# Patient Record
Sex: FEMALE | Race: WHITE
[De-identification: names, ages, dates, MRNs, and addresses within clinical notes are randomized per-mention and may not be internally consistent; named-entity substitution may affect disease eponyms.]

---

## 2017-03-16 ENCOUNTER — HOSPITAL ENCOUNTER (OUTPATIENT)
Dept: HOSPITAL 80 - FIMAGING | Age: 32
End: 2017-03-16
Attending: NURSE PRACTITIONER
Payer: MEDICAID

## 2017-03-16 DIAGNOSIS — Z12.39: Primary | ICD-10-CM

## 2017-03-16 DIAGNOSIS — Z86.018: ICD-10-CM

## 2017-03-16 DIAGNOSIS — N63: ICD-10-CM

## 2017-05-16 ENCOUNTER — HOSPITAL ENCOUNTER (OUTPATIENT)
Dept: HOSPITAL 80 - FIMAGING | Age: 32
End: 2017-05-16
Attending: SPECIALIST
Payer: MEDICAID

## 2017-05-16 DIAGNOSIS — R74.8: Primary | ICD-10-CM

## 2018-04-24 ENCOUNTER — HOSPITAL ENCOUNTER (EMERGENCY)
Dept: HOSPITAL 80 - FED | Age: 33
Discharge: HOME | End: 2018-04-24
Payer: MEDICAID

## 2018-04-24 VITALS — DIASTOLIC BLOOD PRESSURE: 68 MMHG | SYSTOLIC BLOOD PRESSURE: 96 MMHG

## 2018-04-24 DIAGNOSIS — B96.89: ICD-10-CM

## 2018-04-24 DIAGNOSIS — N39.0: ICD-10-CM

## 2018-04-24 DIAGNOSIS — N83.201: Primary | ICD-10-CM

## 2018-04-24 DIAGNOSIS — E86.9: ICD-10-CM

## 2018-04-24 LAB — PLATELET # BLD: 208 10^3/UL (ref 150–400)

## 2018-04-24 NOTE — EDPHY
H & P


Time Seen by Provider: 04/24/18 14:30


HPI/ROS: 





Chief complaint.  Abdominal pain





HPI.  Patient is a 32-year-old female presents emergency department with 

abdominal pain that began at 10:00 a.m. This morning.  She has a history of 

dysmenorrhea.  She just began her menstrual period.  She describes low 

abdominal cramping that radiates to her back.  It is associated with vomiting 

and diarrhea.  No urinary symptoms.  No fever.  She has had similar symptoms 

previously.  She also notes some shortness of breath that began with the 

vomiting.  She does not have her inhaler with her





ROS


Constitutional.  no fever/chills, no weakness


Eyes.  no problems with vision


ENT.  no sore throat, no nasal drainage


Cardiovascular.  no chest pain


Respiratory.  no shortness of breath, no cough


Abdominal.  Abdominal pain and vaginal bleeding with vomiting and diarrhea


.  no problems urinating


MS.  no calf pain/swelling, no neck/back pain, no joint pain


Skin.  no rash


Lymph.  no swollen glands


Neuro.  no headache, no dizziness, no difficulty walking or with speech


Past Medical/Surgical History: 





Past medical history significant ovarian cyst and asthma


Social History: 





, nonsmoker, no alcohol


Smoking Status: Never smoked


Physical Exam: 





General Appearance:  Alert pleasant well-developed female mild distress vitals 

are stable


Eyes: Pupils equal and round no pallor or injection.


ENT, Mouth:  Mucous membranes are moist.


Respiratory:  No retractions.  Mild inspiratory expiratory wheezing


Cardiovascular: Regular rate and rhythm.


Gastrointestinal:  Abdomen is soft with suprapubic tenderness.  No masses.  

Normal bowel sounds


Neurological:  Awake and alert, sensory and motor exams grossly normal.


Skin: Warm and dry, no rashes.


Musculoskeletal:  Neck is supple nontender.


Extremities  symmetrical, full range of motion.


Psychiatric: Patient is oriented X 3, there is no agitation.


Constitutional: 


 Initial Vital Signs











Temperature (C)  36.6 C   04/24/18 14:08


 


Heart Rate  73   04/24/18 14:08


 


Respiratory Rate  18   04/24/18 14:08


 


Blood Pressure  127/82 H  04/24/18 14:08


 


O2 Sat (%)  97   04/24/18 14:08








 











O2 Delivery Mode               Room Air














Allergies/Adverse Reactions: 


 





Penicillins Allergy (Verified 04/24/18 14:07)


 








Home Medications: 














 Medication  Instructions  Recorded


 


Promethazine HCl [Phenergan] 25 mg PO Q6 #10 tab 03/03/15


 


oxyCODONE/APAP 5/325 [Percocet 1 tab PO Q6 #10 tab 03/03/15





5/325]  


 


Cephalexin [Keflex (*)] 500 mg PO TID #15 cap 04/24/18














Medical Decision Making





- Diagnostics


Imaging Results: 


 Imaging Impressions





Pelvic/Renal Ultrasound  04/24/18 14:54


Impression: 


1. Right ovarian simple cyst measuring 2.9 x 2.5 x 2.4 cm.


2. No ovarian torsion or significant free fluid.


3. No uterine leiomyomata.


 


Findings and recommendations discussed with Emergency Department physician, 

Constantino Tobar at 1558 hour, 4/24/2018.


 


Final report concurs with initial preliminary interpretation.








Ultrasound of the pelvis reveals a 2.5 cm ovarian cyst.  No evidence for torsion


ED Course/Re-evaluation: 





Re-evaluation at about 3:40 p.m. Patient is stable and feeling better.  No 

abdominal pain now.





Re-evaluation 4:10 p.m..  Patient and her  and I discussed imaging and 

lab results.  We discussed treatment plan including criteria for return 

importance of follow-up and further evaluation.  They expressed understanding 

and agreement


Differential Diagnosis: 





I considered urinary tract infection, ectopic pregnancy, ovarian cyst, 

dysmenorrhea





- Data Points


Laboratory Results: 


 Laboratory Results





 04/24/18 14:30 





 04/24/18 14:30 





 











  04/24/18 04/24/18 04/24/18





  15:20 14:30 14:30


 


WBC      





    


 


RBC      





    


 


Hgb      





    


 


Hct      





    


 


MCV      





    


 


MCH      





    


 


MCHC      





    


 


RDW      





    


 


Plt Count      





    


 


MPV      





    


 


Neut % (Auto)      





    


 


Lymph % (Auto)      





    


 


Mono % (Auto)      





    


 


Eos % (Auto)      





    


 


Baso % (Auto)      





    


 


Nucleat RBC Rel Count      





    


 


Absolute Neuts (auto)      





    


 


Absolute Lymphs (auto)      





    


 


Absolute Monos (auto)      





    


 


Absolute Eos (auto)      





    


 


Absolute Basos (auto)      





    


 


Absolute Nucleated RBC      





    


 


Immature Gran %      





    


 


Immature Gran #      





    


 


Sodium      142 mEq/L mEq/L





     (135-145) 


 


Potassium      4.0 mEq/L mEq/L





     (3.5-5.2) 


 


Chloride      105 mEq/L mEq/L





     () 


 


Carbon Dioxide      23 mEq/l mEq/l





     (22-31) 


 


Anion Gap      14 mEq/L mEq/L





     (8-16) 


 


BUN      11 mg/dL mg/dL





     (7-23) 


 


Creatinine      0.7 mg/dL mg/dL





     (0.6-1.0) 


 


Estimated GFR      > 60 





    


 


Glucose      101 mg/dL H mg/dL





     () 


 


Calcium      9.6 mg/dL mg/dL





     (8.5-10.4) 


 


Beta HCG, Qual    NEGATIVE   





    


 


Urine Color  YELLOW     





    


 


Urine Appearance  HAZY     





    


 


Urine pH  9.0  H     





   (5.0-7.5)   


 


Ur Specific Gravity  1.010     





   (1.002-1.030)   


 


Urine Protein  2+  H     





   (NEGATIVE)   


 


Urine Ketones  1+  H     





   (NEGATIVE)   


 


Urine Blood  3+  H     





   (NEGATIVE)   


 


Urine Nitrate  NEGATIVE     





   (NEGATIVE)   


 


Urine Bilirubin  NEGATIVE     





   (NEGATIVE)   


 


Urine Urobilinogen  NEGATIVE EU EU    





   (0.2-1.0)   


 


Ur Leukocyte Esterase  TRACE  H     





   (NEGATIVE)   


 


Urine RBC   /hpf H /hpf    





   (0-3)   


 


Urine WBC  25-50 /hpf H /hpf    





   (0-3)   


 


Ur Epithelial Cells  1+ /lpf /lpf    





   (NONE-1+)   


 


Urine Bacteria  1+ /hpf H /hpf    





   (NONE SEEN)   


 


Urine Glucose  NEGATIVE     





   (NEGATIVE)   














  04/24/18





  14:30


 


WBC  10.78 10^3/uL H 10^3/uL





   (3.80-9.50) 


 


RBC  4.64 10^6/uL 10^6/uL





   (4.18-5.33) 


 


Hgb  14.2 g/dL g/dL





   (12.6-16.3) 


 


Hct  41.6 % %





   (38.0-47.0) 


 


MCV  89.7 fL fL





   (81.5-99.8) 


 


MCH  30.6 pg pg





   (27.9-34.1) 


 


MCHC  34.1 g/dL g/dL





   (32.4-36.7) 


 


RDW  12.6 % %





   (11.5-15.2) 


 


Plt Count  208 10^3/uL 10^3/uL





   (150-400) 


 


MPV  11.4 fL fL





   (8.7-11.7) 


 


Neut % (Auto)  88.8 % H %





   (39.3-74.2) 


 


Lymph % (Auto)  7.1 % L %





   (15.0-45.0) 


 


Mono % (Auto)  2.9 % L %





   (4.5-13.0) 


 


Eos % (Auto)  0.4 % L %





   (0.6-7.6) 


 


Baso % (Auto)  0.4 % %





   (0.3-1.7) 


 


Nucleat RBC Rel Count  0.0 % %





   (0.0-0.2) 


 


Absolute Neuts (auto)  9.58 10^3/uL H 10^3/uL





   (1.70-6.50) 


 


Absolute Lymphs (auto)  0.77 10^3/uL L 10^3/uL





   (1.00-3.00) 


 


Absolute Monos (auto)  0.31 10^3/uL 10^3/uL





   (0.30-0.80) 


 


Absolute Eos (auto)  0.04 10^3/uL 10^3/uL





   (0.03-0.40) 


 


Absolute Basos (auto)  0.04 10^3/uL 10^3/uL





   (0.02-0.10) 


 


Absolute Nucleated RBC  0.00 10^3/uL 10^3/uL





   (0-0.01) 


 


Immature Gran %  0.4 % %





   (0.0-1.1) 


 


Immature Gran #  0.04 10^3/uL 10^3/uL





   (0.00-0.10) 


 


Sodium  





  


 


Potassium  





  


 


Chloride  





  


 


Carbon Dioxide  





  


 


Anion Gap  





  


 


BUN  





  


 


Creatinine  





  


 


Estimated GFR  





  


 


Glucose  





  


 


Calcium  





  


 


Beta HCG, Qual  





  


 


Urine Color  





  


 


Urine Appearance  





  


 


Urine pH  





  


 


Ur Specific Gravity  





  


 


Urine Protein  





  


 


Urine Ketones  





  


 


Urine Blood  





  


 


Urine Nitrate  





  


 


Urine Bilirubin  





  


 


Urine Urobilinogen  





  


 


Ur Leukocyte Esterase  





  


 


Urine RBC  





  


 


Urine WBC  





  


 


Ur Epithelial Cells  





  


 


Urine Bacteria  





  


 


Urine Glucose  





  











Medications Given: 


 








Discontinued Medications





Albuterol/Ipratropium (Duoneb)  3 ml IH EDNOW ONE


   Stop: 04/24/18 14:55


   Last Admin: 04/24/18 15:02 Dose:  3 ml


Sodium Chloride (Ns)  1,000 mls @ 0 mls/hr IV EDNOW ONE; Wide Open


   PRN Reason: Protocol


   Stop: 04/24/18 14:55


   Last Admin: 04/24/18 15:02 Dose:  1,000 mls


Sodium Chloride (Ns)  1,000 mls @ 0 mls/hr IV EDNOW ONE; Wide Open


   PRN Reason: Protocol


   Stop: 04/24/18 14:55


   Last Admin: 04/24/18 15:02 Dose:  1,000 mls


Ketorolac Tromethamine (Toradol)  30 mg IVP EDNOW ONE


   Stop: 04/24/18 14:55


   Last Admin: 04/24/18 15:02 Dose:  30 mg


Ondansetron HCl (Zofran)  4 mg IVP EDNOW ONE


   Stop: 04/24/18 14:55


   Last Admin: 04/24/18 15:03 Dose:  4 mg








Departure





- Departure


Disposition: Home, Routine, Self-Care


Clinical Impression: 


Ovarian cyst


Qualifiers:


 Laterality: right Qualified Code(s): N83.201 - Unspecified ovarian cyst, right 

side





Urinary tract infection


Qualifiers:


 Urinary tract infection type: site unspecified Hematuria presence: with 

hematuria Qualified Code(s): N39.0 - Urinary tract infection, site not specified

; R31.9 - Hematuria, unspecified; R31.9 - Hematuria, unspecified





Condition: Good


Instructions:  Ovarian Cyst (ED), Urinary Tract Infection in Women (ED)


Additional Instructions: 


Drink plenty of fluids and stay hydrated.  Ibuprofen 600 mg every 6 hr for 

discomfort.  Cephalexin as antibiotic.  Return for worsening symptoms.  Recheck 

in 2 days if not improved


Referrals: 


Vickie Neumann, PAC [Primary Care Provider] - 2-3 days, if not improved


Prescriptions: 


Cephalexin [Keflex (*)] 500 mg PO TID #15 cap

## 2018-10-08 ENCOUNTER — HOSPITAL ENCOUNTER (OUTPATIENT)
Dept: HOSPITAL 80 - FSGY | Age: 33
Discharge: HOME | End: 2018-10-08
Attending: ORTHOPAEDIC SURGERY
Payer: MEDICAID

## 2018-10-08 VITALS — DIASTOLIC BLOOD PRESSURE: 70 MMHG | SYSTOLIC BLOOD PRESSURE: 109 MMHG

## 2018-10-08 DIAGNOSIS — Z88.0: ICD-10-CM

## 2018-10-08 DIAGNOSIS — Q65.89: ICD-10-CM

## 2018-10-08 DIAGNOSIS — M25.852: Primary | ICD-10-CM

## 2018-10-08 DIAGNOSIS — Z87.440: ICD-10-CM

## 2018-10-08 PROCEDURE — BQ111ZZ FLUOROSCOPY OF LEFT HIP USING LOW OSMOLAR CONTRAST: ICD-10-PCS | Performed by: ORTHOPAEDIC SURGERY

## 2018-10-08 PROCEDURE — C1713 ANCHOR/SCREW BN/BN,TIS/BN: HCPCS

## 2018-10-08 PROCEDURE — 0SQB4ZZ REPAIR LEFT HIP JOINT, PERCUTANEOUS ENDOSCOPIC APPROACH: ICD-10-PCS | Performed by: ORTHOPAEDIC SURGERY

## 2018-10-08 PROCEDURE — 0SBB4ZZ EXCISION OF LEFT HIP JOINT, PERCUTANEOUS ENDOSCOPIC APPROACH: ICD-10-PCS | Performed by: ORTHOPAEDIC SURGERY

## 2018-10-08 NOTE — PDANEPAE
ANE History of Present Illness


here for hip scope








ANE Past Medical History





- Cardiovascular History


Hx Hypertension: No


Hx Arrhythmias: No


Hx Chest Pain: No


Hx Coronary Artery / Peripheral Vascular Disease: No


Hx CHF / Valvular Disease: No


Hx Palpitations: No


Cardiovascular History Comment: POSS SL MURMUR AS TEEN





- Pulmonary History


Hx COPD: No


Hx Asthma/Reactive Airway Disease: No


Hx Recent Upper Respiratory Infection: No


Hx Oxygen in Use at Home: No


Hx Sleep Apnea: No


Sleep Apnea Screening Result - Last Documented: Negative


Pulmonary History Comment: VOCAL CORD DISORDER,SOMETIMES AFTER BIG MEAL.  

INTERMITTENT BREATHING ISSUES DURING HIGH SCHOOL NO SPECIFIC TRIGGERS 

IDENTIFIED.  INFREQUENT USE OF INHALER





- Neurologic History


Hx Cerebrovascular Accident: No


Hx Seizures: No


Hx Dementia: No





- Endocrine History


Hx Diabetes: No





- Renal History


Hx Renal Disorders: Yes


Renal History Comment: HX OF UTI'S





- Liver History


Hx Hepatic Disorders: No





- Neurological & Psychiatric Hx


Hx Neurological and Psychiatric Disorders: No





- Cancer History


Hx Cancer: No





- Congenital Disorder History


Hx Congenital Disorders: Yes


Congenital History Comment: MAGALY HIPS





- GI History


Hx Gastrointestinal Disorders: Yes


Gastrointestinal History Comment: PRONE TO NAUSEA AT THE SLIGHTEST PROVACATION 

CAN HAPPEN DURING MENSES





- Other Health History


Other Health History: INTERMITTENT RASH





- Chronic Pain History


Chronic Pain: Yes (MAGALY HIPS)





- Surgical History


Prior Surgeries: LT HIP SCOPE FEMOROPLASTY 10/8/18





ANE Review of Systems


Review of Systems: 








- Exercise capacity


METS (RN): 6 METS





ANE Patient History





- Allergies


Allergies/Adverse Reactions: 








Penicillins Allergy (Verified 09/10/18 11:41)


 RASH,HIVES HANDS & ARMS








- Home Medications


Home medications: home medication list seen and reviewed


Home Medications: 








Ibuprofen [Motrin (*)] 400 mg PO Q8 PRN 09/14/18 [Last Taken Unknown]


Imiquimod 1 each TP MOWEFR@21 09/14/18 [Last Taken Unknown]


Levalbuterol Inhaler [Xopenex Hfa Inhaler (*)] 2 puffs IH DAILY PRN 09/14/18 [

Last Taken Unknown]


Ondansetron Odt [Zofran Odt 4 mg (*)] 4 mg PO Q4 PRN 09/14/18 [Last Taken 

Unknown]








- NPO status


NPO Status: no food or drink >8 hours





- Anes Hx


Anes Hx: no prior problems





- Smoking Hx


Smoking Status: Never smoked





- Alcohol Use


Alcohol Use: Rarely





- Family Anes Hx


Family Anes Hx: none


Family Hx Anesthesia Complications: NEG





ANE Labs/Vital Signs





- Vital Signs


Vital Signs: reviewed preoperatively; see RN documention for details


Height: 177.8 cm


Weight: 63.503 kg





ANE Physical Exam





- Airway


Neck exam: FROM


Mallampati Score: Class 2


Mouth exam: normal dental/mouth exam





- Pulmonary


Pulmonary: no respiratory distress, clear to auscultation





- Cardiovascular


Cardiovascular: regular rate and rhythym, no murmur, rub, or gallop





- ASA Status


ASA Status: II





ANE Anesthesia Plan


Anesthesia Plan: general endotracheal anesthesia

## 2018-10-08 NOTE — POSTOPPROG
Post Op Note


Date of Operation: 10/08/18


Surgeon: Eric Bravo


Assistant: Dr. Joyner


Anesthesia: GET(General Endotracheal)


Pre-op Diagnosis: Left JENNY


Post-op Diagnosis: Same


Procedure: Left Hip Arthroscopy


Inf/Abcess present in the surg proc area at time of surgery?: No

## 2018-10-08 NOTE — PDGENHP
History and Physical





- Chief Complaint


LEFT HIP PAIN





- History of Present Illness


Diagnosis:





1.   Bilateral Femoroacetabular impingement (JENNY) Cam type


2.   Bilateral Hip Dyplasia


3.   Bilateral femoral/tibial malrotation (Left side symptomatic >>> Right)





HISTORY OF PRESENT ILLNESS:


Avtaris a 33 y.o.~~~active female~who I have had the pleasure to consult on 

today. I have enjoyed meeting her. She~lives in [place], [place]. 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 

a juggler/entertainer. ~She~is ; she~has no children. ~Avtarenjoys 

unicycling, juggling, hiking, rock climbing.





Dimass bilateral hip pain started several years ago, with some~recalled 

trauma or injury, and with some~previous complaints. Back in 2010- doing some 

hard stretching and felt a sensation of it slipping out of place - but this was 

nonpainful. Then 2 years ago started having lower back pain followed by hip 

pain in the past 6 months. Hips started bothering her when she began PT for her 

lower back. Avtardoes not have~a known history of hip dysplasia (h/o 

dysplasia in sister and father). 





Presentation today is of anterior bilateral hip pain (left >>~right). ~The hip 

does~wake her~at night and does~click and catch on her. Sitting can be a real 

struggle for her. Avtardoes~report suffering from lower back pain episodes. 





Avtarhas not~participated in physical therapy (for the hip specifically) and 

has not~tried other conservative measures.





Avtarhas not~utilized medication for pain management.





Avtarunderstands that she~has a hip and pelvis problem which should be 

researched and wishes to get a better understanding of her~hip status, followed 

by an establishment of a treatment strategy, hoping she~would be able to get 

back to her~well being active life.














History:








Past medical history: ~


Patient ~has a past medical history of Asthma.





Relevant familial history: hip dysplasia sister and father





Past surgical history: 


None





Avtarhas never received general anesthesia.





I have reviewed, verified and agree with the past medical, surgical, family and 

social history.





Current Medications:~has a current medication list which includes the following 

prescription(s): ibuprofen.





ALLERGIES:~is allergic to penicillins.














Objective:








Physical Examination:


Avtaris 5 feet 9 inches tall and weighs 140 Lbs. Avtaris AAO x3; she~is 

well-nourished, in NAD. Skin is warm and dry. ~Breathing is non-labored. ~CV 

with RRR by pulse. Abdomen is soft, NTND. 


Currently, she~walks with a normal~gait.


Trendelenburg sign is positive and proprioception is reduced, both~sides.


She~presents with no~signs of joint laxity. Beightons Score: 0





She~is fit looking. ~~


Lower spine examination is negative for sciatic or femoral nerve irritation 

with negative SLR &~femoral stretch tests. Range of motion of the spine is 

normal~for flexion, extension, and rotations, with no associated pain. 


Strength, Sensation and pulses are normal - bilaterally


Ankles and knees exams are normal~and no~mal-alignment is evident. 


She~has right~0.5 cm short leg length discrepancy.


Thigh circumference is symmetric with no evidence for muscle atrophy on both~

sides.





Hip ROM (degrees):











 FL ER


 At 90~hip FL IR


 At 90~hip FL AB AD EX IR


 Neutral hip ER


 Neutral hip


 


R 110 55 20 35 10 5 45 40


 


L 120 50 35 35 10 5 45 40








Specific hip and pelvis tests:











 Impingement Test ILYA Roll Add. Longus


 


R Negative +++ Negative Negative


 


L +++ +++ Negative +














 Glut. Med ITB Posterior Imp


 


R Negative


 5/5 strength Negative


 5/5 strength Negative


 


L Negative


 5/5 strength Negative


 5/5 strength Negative








Squeeze test measured normal


Bony Symphysis pubis is painful to touch while concentric activity of the 

rectus abdominis, does not~produce pain at its insertion.


Ilio Psos specific tests are negative for pain during cycling for both hips~and 

remarkable for non painful snap~~On th eleft


HF has no pain both hips.


lateral capsule tenderness on the left


Greater trochanteric burse is painful on the left hip.


Piriformis tests: FAIR is negative, with no local signs of neuritis related to 

sciatic nerve.


SIJs examination is painful bilaterally with normal~ILYA in relation and 

local tenderness.


Hamstrings tests are negative both hips.





Tibial torsion - 55 bilaterally ER 





On a daily basis, the following percentages reflect Concepcion's overall total pain

:


Deep anterior hip: 50%


SI joint: 50%





Imaging:


Radiology studies which I have personally reviewed, analyzed and measured are 

below:





XR: 


AP of the hip and pelvis: 


Performed in a suboptimal technique 


Coccyx to pubic symphysis distance 2.2 cm.


0 degrees caudal





[place] Lines are preserved.


Marked Pathological signs are seen in the Symphysis Pubis. 


No Pathological signs are seen at the Ischial tuberosity. ~





Specific measurements show: 














 NSA~ LCE Sourcil~Angle  Sharp's angle Lat.


 Cam Lat. Pincer C.Over~sign Head~Coverage % ATDmm


 


R 128 16 9 41 N N N 65 +


 


L 125 19 11 40 N N N 73 +




















 Pos. wall sign ISS NAD ~~Dysplasia Comments


 


R + Negative 18.6 mm +++ 


 


L + Negative 14.4 mm ++ 


 


 Sclerosis Sup. Lat. OA Cysts Joint Space-WBZ Joint Space-Medial


 


R Negative Negative Negative 4.6 mm 4.5 mm


 


L Negative Negative Negative 4.5 mm 5.1 mm











X Table lateral: 


Anterior cam lesion is seen~on both hips.





Alpha Angle: ~


Right 74 dergrees


Left 76 degrees





Impression and plan: 


Avtaris a 33 y.o.~active female~suffering from symptomatic Left hip pain due 

to Left Hip Dyplasia, Left Femoroacetabular impingement (JENNY) Cam type, and 

Left (clinical) femoral and tibial malrotation causing significant disability 

to her~and altering her~sport and life activities.





Physical examination, imaging, and her~story correspond with the diagnosis 

mentioned above.





I explained that hip dysplasia is a condition wherein the hip joint has 

excessive play~and instability due to a variety of factors, including the 

depth and adequacy of the socket, the orientation of the femur bone, and 

ligament laxity around the hip joint. Dysplasia ranges in severity from 

borderline to clement, with treatment options being specific to the specific 

nature of the problem. Left untreated, the instability in the hip joint can 

cause progressive tearing of the labrum and deterioration of the surface 

cartilage, ultimately resulting in progressive osteoarthritis of the hip. 





I explained that femoroacetabular impingement (JENNY - Cam type) arises due to a 

bony or soft tissue conflict between the femur (ball) and acetabulum (socket) 

caused by an abnormality in the shape of the femoral head and neck. Over time, 

repetitive impingement can result in damage to the labrum and adjacent surface 

cartilage within the socket, ultimately giving rise to progressive 

osteoarthritis of the hip.





I explained that although a labral tear can be a source of pain, it is rarely 

the root of the problem and typically occurs secondary to an underlying 

abnormality in the shape and mechanics of the hip joint. 





I reviewed conservative treatment options for Dysplasia and JENNY including 

activity modification to avoid positions of impingement or instability, 

physical therapy, non-steroidal anti-inflammatory medications, and various 

injections (corticosteroid and PRP) aimed at reducing inflammation in the hip 

joint or/and preventing dynamic instability and impingement. PRP injections may 

promote healing and reduce symptoms in certain cases but it will not repair 

chronically damaged tissue. Although these measures may help to buy time~and 

reduce current level of symptoms, they are not a definitive solution to the 

problem given the underlying abnormality in the shape of the hip joint.





Patients who have failed conservative management and continue to experience 

symptoms are candidates for definitive surgical treatment, which may consist of 

hip arthroscopy alone or in combination with more invasive bony realignment 

procedures of the hip socket and/or femur called periacetabular osteotomy (JEFF) 

or derotational femoral osteotomy (DFO). 





Hip arthroscopy typically includes treating the labrum with either repair or 

reconstruction of the torn labrum; as well as addressing the underlying 

abnormalities by restoring the normal shape to the hip joint. If the cartilage 

is damaged a Microfracture surgical procedure may also be necessary to help 

stimulate the growth of fibrocartilage. If a patient requires a labral 

reconstruction or a Microfracture, the initial rehabilitation from the surgery 

may take longer, but the long term results are typically favorable.





Avtarwill review the info presented.





In order to obtain more detailed information regarding the alignment, 

orientation, and shape of the bony hip and pelvis I will order a CT scan to be 

performed. The results of the CT scan, including femoral torsion and acetabular 

version measured values and 3D images, will aid me in deciding on the best 

treatment strategy and surgical pre-planning. 


(Given clinical signs of excessive external tibial torsion will get CT that 

includes proximal and distal tibia).





Concepcion is going to think about her options and get back to us.








Avtaris happy with this plan.





I have also supplied her~with handouts, outlining the expected surgical 

treatment and rehab involved.





I wish~Concepcion~all the best,











~~


Mandie Mancuso MD








History Information





- Allergies/Home Medication List


Allergies/Adverse Reactions: 








Penicillins Allergy (Verified 09/10/18 11:41)


 RASH,HIVES HANDS & ARMS





Home Medications: 








Ibuprofen [Motrin (*)] 400 mg PO Q8 PRN 09/14/18 [Last Taken Unknown]


Imiquimod 1 each TP MOWEFR@21 09/14/18 [Last Taken Unknown]


Levalbuterol Inhaler [Xopenex Hfa Inhaler (*)] 2 puffs IH DAILY PRN 09/14/18 [

Last Taken Unknown]


Ondansetron Odt [Zofran Odt 4 mg (*)] 4 mg PO Q4 PRN 09/14/18 [Last Taken 

Unknown]





I have personally reviewed and updated: medical history





- Social History


Smoking Status: Never smoked





Review of Systems


Review of Systems: 








Physical Exam


Physical Exam:

## 2018-10-14 NOTE — PDGENHP
History and Physical





- Chief Complaint


LEFT HIP PAIN





- History of Present Illness


Diagnosis:





1.   Bilateral Femoroacetabular impingement (JENNY) Cam type


2.   Bilateral Hip Dyplasia


3.   Bilateral femoral/tibial malrotation (Left side symptomatic >>> Right)





HISTORY OF PRESENT ILLNESS:


Avtaris a 33 y.o.~~~active female~who I have had the pleasure to consult on 

today. I have enjoyed meeting her. She~lives in [place], [place]. 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 

a juggler/entertainer. ~She~is ; she~has no children. ~Avtarenjoys 

unicycling, juggling, hiking, rock climbing.





Dimass bilateral hip pain started several years ago, with some~recalled 

trauma or injury, and with some~previous complaints. Back in 2010- doing some 

hard stretching and felt a sensation of it slipping out of place - but this was 

nonpainful. Then 2 years ago started having lower back pain followed by hip 

pain in the past 6 months. Hips started bothering her when she began PT for her 

lower back. Avtardoes not have~a known history of hip dysplasia (h/o 

dysplasia in sister and father). 





Presentation today is of anterior bilateral hip pain (left >>~right). ~The hip 

does~wake her~at night and does~click and catch on her. Sitting can be a real 

struggle for her. Avtardoes~report suffering from lower back pain episodes. 





Avtarhas not~participated in physical therapy (for the hip specifically) and 

has not~tried other conservative measures.





Avtarhas not~utilized medication for pain management.





Avtarunderstands that she~has a hip and pelvis problem which should be 

researched and wishes to get a better understanding of her~hip status, followed 

by an establishment of a treatment strategy, hoping she~would be able to get 

back to her~well being active life.














History:








Past medical history: ~


Patient ~has a past medical history of Asthma.





Relevant familial history: hip dysplasia sister and father





Past surgical history: 


None





Avtarhas never received general anesthesia.





I have reviewed, verified and agree with the past medical, surgical, family and 

social history.





Current Medications:~has a current medication list which includes the following 

prescription(s): ibuprofen.





ALLERGIES:~is allergic to penicillins.














Objective:








Physical Examination:


Avtaris 5 feet 9 inches tall and weighs 140 Lbs. Avtaris AAO x3; she~is 

well-nourished, in NAD. Skin is warm and dry. ~Breathing is non-labored. ~CV 

with RRR by pulse. Abdomen is soft, NTND. 


Currently, she~walks with a normal~gait.


Trendelenburg sign is positive and proprioception is reduced, both~sides.


She~presents with no~signs of joint laxity. Beightons Score: 0





She~is fit looking. ~~


Lower spine examination is negative for sciatic or femoral nerve irritation 

with negative SLR &~femoral stretch tests. Range of motion of the spine is 

normal~for flexion, extension, and rotations, with no associated pain. 


Strength, Sensation and pulses are normal - bilaterally


Ankles and knees exams are normal~and no~mal-alignment is evident. 


She~has right~0.5 cm short leg length discrepancy.


Thigh circumference is symmetric with no evidence for muscle atrophy on both~

sides.





Hip ROM (degrees):











 FL ER


 At 90~hip FL IR


 At 90~hip FL AB AD EX IR


 Neutral hip ER


 Neutral hip


 


R 110 55 20 35 10 5 45 40


 


L 120 50 35 35 10 5 45 40








Specific hip and pelvis tests:











 Impingement Test ILYA Roll Add. Longus


 


R Negative +++ Negative Negative


 


L +++ +++ Negative +














 Glut. Med ITB Posterior Imp


 


R Negative


 5/5 strength Negative


 5/5 strength Negative


 


L Negative


 5/5 strength Negative


 5/5 strength Negative








Squeeze test measured normal


Bony Symphysis pubis is painful to touch while concentric activity of the 

rectus abdominis, does not~produce pain at its insertion.


Ilio Psos specific tests are negative for pain during cycling for both hips~and 

remarkable for non painful snap~~On th eleft


HF has no pain both hips.


lateral capsule tenderness on the left


Greater trochanteric burse is painful on the left hip.


Piriformis tests: FAIR is negative, with no local signs of neuritis related to 

sciatic nerve.


SIJs examination is painful bilaterally with normal~ILYA in relation and 

local tenderness.


Hamstrings tests are negative both hips.





Tibial torsion - 55 bilaterally ER 





On a daily basis, the following percentages reflect Concepcion's overall total pain

:


Deep anterior hip: 50%


SI joint: 50%





Imaging:


Radiology studies which I have personally reviewed, analyzed and measured are 

below:





XR: 


AP of the hip and pelvis: 


Performed in a suboptimal technique 


Coccyx to pubic symphysis distance 2.2 cm.


0 degrees caudal





[place]ton Lines are preserved.


Marked Pathological signs are seen in the Symphysis Pubis. 


No Pathological signs are seen at the Ischial tuberosity. ~





Specific measurements show: 














 NSA~ LCE Sourcil~Angle  Sharp's angle Lat.


 Cam Lat. Pincer C.Over~sign Head~Coverage % ATDmm


 


R 128 16 9 41 N N N 65 +


 


L 125 19 11 40 N N N 73 +




















 Pos. wall sign ISS NAD ~~Dysplasia Comments


 


R + Negative 18.6 mm +++ 


 


L + Negative 14.4 mm ++ 


 


 Sclerosis Sup. Lat. OA Cysts Joint Space-WBZ Joint Space-Medial


 


R Negative Negative Negative 4.6 mm 4.5 mm


 


L Negative Negative Negative 4.5 mm 5.1 mm











X Table lateral: 


Anterior cam lesion is seen~on both hips.





Alpha Angle: ~


Right 74 dergrees


Left 76 degrees





Impression and plan: 


Avtaris a 33 y.o.~active female~suffering from symptomatic Left hip pain due 

to Left Hip Dyplasia, Left Femoroacetabular impingement (JENNY) Cam type, and 

Left (clinical) femoral and tibial malrotation causing significant disability 

to her~and altering her~sport and life activities.





Physical examination, imaging, and her~story correspond with the diagnosis 

mentioned above.





I explained that hip dysplasia is a condition wherein the hip joint has 

excessive play~and instability due to a variety of factors, including the 

depth and adequacy of the socket, the orientation of the femur bone, and 

ligament laxity around the hip joint. Dysplasia ranges in severity from 

borderline to clement, with treatment options being specific to the specific 

nature of the problem. Left untreated, the instability in the hip joint can 

cause progressive tearing of the labrum and deterioration of the surface 

cartilage, ultimately resulting in progressive osteoarthritis of the hip. 





I explained that femoroacetabular impingement (JENNY - Cam type) arises due to a 

bony or soft tissue conflict between the femur (ball) and acetabulum (socket) 

caused by an abnormality in the shape of the femoral head and neck. Over time, 

repetitive impingement can result in damage to the labrum and adjacent surface 

cartilage within the socket, ultimately giving rise to progressive 

osteoarthritis of the hip.





I explained that although a labral tear can be a source of pain, it is rarely 

the root of the problem and typically occurs secondary to an underlying 

abnormality in the shape and mechanics of the hip joint. 





I reviewed conservative treatment options for Dysplasia and JENNY including 

activity modification to avoid positions of impingement or instability, 

physical therapy, non-steroidal anti-inflammatory medications, and various 

injections (corticosteroid and PRP) aimed at reducing inflammation in the hip 

joint or/and preventing dynamic instability and impingement. PRP injections may 

promote healing and reduce symptoms in certain cases but it will not repair 

chronically damaged tissue. Although these measures may help to buy time~and 

reduce current level of symptoms, they are not a definitive solution to the 

problem given the underlying abnormality in the shape of the hip joint.





Patients who have failed conservative management and continue to experience 

symptoms are candidates for definitive surgical treatment, which may consist of 

hip arthroscopy alone or in combination with more invasive bony realignment 

procedures of the hip socket and/or femur called periacetabular osteotomy (JEFF) 

or derotational femoral osteotomy (DFO). 





Hip arthroscopy typically includes treating the labrum with either repair or 

reconstruction of the torn labrum; as well as addressing the underlying 

abnormalities by restoring the normal shape to the hip joint. If the cartilage 

is damaged a Microfracture surgical procedure may also be necessary to help 

stimulate the growth of fibrocartilage. If a patient requires a labral 

reconstruction or a Microfracture, the initial rehabilitation from the surgery 

may take longer, but the long term results are typically favorable.





Avtarwill review the info presented.





In order to obtain more detailed information regarding the alignment, 

orientation, and shape of the bony hip and pelvis I will order a CT scan to be 

performed. The results of the CT scan, including femoral torsion and acetabular 

version measured values and 3D images, will aid me in deciding on the best 

treatment strategy and surgical pre-planning. 


(Given clinical signs of excessive external tibial torsion will get CT that 

includes proximal and distal tibia).





Concepcion is going to think about her options and get back to us.








Avtaris happy with this plan.





I have also supplied her~with handouts, outlining the expected surgical 

treatment and rehab involved.





I wish~Concepcion~all the best,











~~


Mandie Mancuso MD





History Information





- Allergies/Home Medication List


Allergies/Adverse Reactions: 








Penicillins Allergy (Verified 09/10/18 11:41)


 RASH,HIVES HANDS & ARMS





Home Medications: 








Ibuprofen [Motrin (*)] 400 mg PO Q8 PRN 09/14/18 [Last Taken Unknown]


Imiquimod 1 each TP MOWEFR@21 09/14/18 [Last Taken Unknown]


Levalbuterol Inhaler [Xopenex Hfa Inhaler (*)] 2 puffs IH DAILY PRN 09/14/18 [

Last Taken Unknown]


Ondansetron Odt [Zofran Odt 4 mg (*)] 4 mg PO Q4 PRN 09/14/18 [Last Taken 

Unknown]





I have personally reviewed and updated: medical history





- Social History


Smoking Status: Never smoked





Review of Systems


Review of Systems: 








Physical Exam


Physical Exam:

## 2018-10-15 ENCOUNTER — HOSPITAL ENCOUNTER (INPATIENT)
Dept: HOSPITAL 80 - F3N | Age: 33
LOS: 3 days | Discharge: HOME | DRG: 850 | End: 2018-10-18
Attending: ORTHOPAEDIC SURGERY | Admitting: ORTHOPAEDIC SURGERY
Payer: MEDICAID

## 2018-10-15 DIAGNOSIS — M25.852: ICD-10-CM

## 2018-10-15 DIAGNOSIS — J45.909: ICD-10-CM

## 2018-10-15 DIAGNOSIS — D64.9: ICD-10-CM

## 2018-10-15 DIAGNOSIS — I95.9: ICD-10-CM

## 2018-10-15 DIAGNOSIS — G89.18: Primary | ICD-10-CM

## 2018-10-15 DIAGNOSIS — M25.851: ICD-10-CM

## 2018-10-15 DIAGNOSIS — Q65.89: ICD-10-CM

## 2018-10-15 LAB — PLATELET # BLD: 214 10^3/UL (ref 150–400)

## 2018-10-15 PROCEDURE — 0QS504Z REPOSITION LEFT ACETABULUM WITH INTERNAL FIXATION DEVICE, OPEN APPROACH: ICD-10-PCS | Performed by: ORTHOPAEDIC SURGERY

## 2018-10-15 PROCEDURE — C1713 ANCHOR/SCREW BN/BN,TIS/BN: HCPCS

## 2018-10-15 RX ADMIN — DOCUSATE SODIUM AND SENNOSIDES SCH TAB: 50; 8.6 TABLET ORAL at 22:54

## 2018-10-15 RX ADMIN — NAPROXEN SODIUM SCH MG: 220 TABLET, FILM COATED ORAL at 22:55

## 2018-10-15 RX ADMIN — SODIUM CHLORIDE SCH MLS: 900 INJECTION, SOLUTION INTRAVENOUS at 15:03

## 2018-10-15 RX ADMIN — Medication SCH EA: at 15:00

## 2018-10-15 RX ADMIN — NAPROXEN SODIUM SCH MG: 220 TABLET, FILM COATED ORAL at 18:14

## 2018-10-15 NOTE — PDMN
Medical Necessity


Medical necessity: CPT [address] [facility] Musculoskeletal Surgery or Procedure GRG,  34 y/o s/p L GEORGI AGUILAR IP only

## 2018-10-15 NOTE — SUROPNOTE
ROSSY Operative Report





- Surgery





Surgery was performed at [facility] on~10/15/18~





Diagnosis:~Left


1.   Hip Acetabular Dysplasia


~


Operation:


Left~Tamara Acetabular Osteotomy (JEFF)





Surgeon: Eric Bravo MD


Assistant:~~Nicolle Joyner MD


Anesthetic: General +~spinal





Procedure:


General anesthetic. Antibiotics given. Cell saver in use. Fluoroscopy.





Phase 1:


Position lateral, diagonal skin incision between ischial tuberosity and greater 

trochanter as for posterior hip approach. Blunt split of glut max fibers. 

Identification of fat pad overlying sciatic nerve. Exposure of sciatic nerve 

under fat pad, gently retracting it away-medially to ischial tuberosity. 

Exposure of subcotoloid fossa proximal to short rotators. Using osteotomes and 

under fluoroscopy, osteotomy of subcotoloid fossa to sciatic notch proximal to 

ischial spine. Closure of lateral cut. Patient is turned supine.





Phase 2:


Skin incision just distal to ASIS. Using diathermy the iliac spine was exposed 

and inguinal ligament + Sartorious were retracted medially, taking the LFCN 

with them, protecting it. Inner ilium was dissected from iliacus muscle bluntly

, with a cob and swab. Dissection continued towards lateral superior ramus 

pubis. Using fluoroscopy an osteotomy of lateral superior ramus, just medial to 

tear drop, was performed with~curved fish mouth osteotome.





Phase 3:


Osteotomy lines of the ilium were marked with diathermy as pre planned 

according to XR/CT and expected correction of acatabulum. 2 Shanz screws were 

drilled into central acetabular fragment, corresponding with planned correction 

angles, in order to mobilize central acetabular fragment after osteotomy is 

complete. ~Iliac osteotomy was performed with reciprocating saw and the main 

acetabular fragment was moved to realign weight bearing position. After 

confirmation of correction using fluoroscopy in AP and false profile planes, 

the fragment was fixed with 3 -~5.5mm~~full threaded~screws~.





Inguinal ligament and Sartorious were attached back to ASIS through drill holes.


Incision was closed according to soft tissue layers.


Skin was closed with~subdermal Monocryl.


Final fluoro shots were obtained to confirm position/correction.


After surgery~Concepcion~moved both lower limbs and had no NV motor compromise.


Specimen - none


Bleeding -~550ml


Complication - none





Evaluation under anesthesia:


IR at 90 degrees hip flexion prior to JEFF was~20~degrees and after JEFF was 10~

degrees.





Bleeding:~550~cc into cell-saver, 270~of blood products were returned to 

patient.





Post op instructions:


1.~toe touch~weight bearing crutches for 3~weeks


2. Epidural analgesia for 24-48 hours


3. Continuous SCD


4. Aspirin 81 mg X1 day once Epidural is discontinued


5. Avoid hip flexion past 90 and hip External rotation. 


6. PT according to my recommendations at follow up visit





Kind regards,








Dr. Eric Bravo


.

## 2018-10-15 NOTE — PDHOSCONS
History and Physical





- Chief Complaint


hip pain





- History of Present Illness


Patient is a 34 yo female without significant PMH presenting with left hip pain 

and found to have left hip acetabular dysplasia now s/p JEFF surgery. Patient 

evaluated by myself in the post operative period. She notes that she has had no 

issues since the surgery, she has no pain and no nausea. She has eaten already 

and is hungry again. She does not concerns that she is due for her period and 

she has a history of having very severe menstrual pain to the extent where she 

has needed to be seen in the ER frequently and has even had loss of 

consciousness in the past. 





History Information





- Allergies/Home Medication List


Allergies/Adverse Reactions: 








Penicillins Allergy (Verified 09/10/18 11:41)


 RASH,HIVES HANDS & ARMS





Home Medications: 








Ibuprofen [Motrin (*)] 400 mg PO Q8 PRN 09/14/18 [Last Taken Unknown]


Imiquimod 1 each TP MOWEFR@21 09/14/18 [Last Taken Unknown]


Levalbuterol Inhaler [Xopenex Hfa Inhaler (*)] 2 puffs IH DAILY PRN 09/14/18 [

Last Taken Unknown]


Ondansetron Odt [Zofran Odt 4 mg (*)] 4 mg PO Q4 PRN 09/14/18 [Last Taken 

Unknown]


Diazepam [Valium 2 MG (*)] 2 mg PO Q6HRS PRN 10/15/18 [Last Taken Unknown]


Naproxen 500 mg PO BID 10/15/18 [Last Taken Unknown]


oxyCODONE/APAP 5/325 [Percocet 5/325 (*)] 1 - 2 tab PO Q4-6PRN PRN 10/15/18 [

Last Taken Unknown]





I have personally reviewed and updated: family history, medical history, social 

history, surgical history





- Past Medical History


Additional medical history: dysmenorrhea.  left hip pain





- Surgical History


Reports: no pertinent surgical hx





- Family History


Positive for: non-pertinent





- Social History


Smoking Status: Never smoked


Alcohol Use: Rarely


Drug Use: None


Additional social history: juggler/entertainer





Review of Systems


Review of Systems: 





ROS: 10pt was reviewed & negative except for what was stated in HPI & below





Physical Exam


Physical Exam: 

















Temp Pulse Resp BP Pulse Ox


 


 36.3 C   70   18   84/50 L  93 


 


 10/15/18 19:37  10/15/18 19:37  10/15/18 19:37  10/15/18 19:37  10/15/18 19:37




















O2 (L/minute)                  8














Constitutional: no apparent distress, appears nourished


Eyes: PERRL


Ears, Nose, Mouth, Throat: moist mucous membranes, hearing normal


Cardiovascular: regular rate and rhythym, no murmur, rub, or gallop, No edema


Respiratory: no respiratory distress, no rales or rhonchi


Gastrointestinal: normoactive bowel sounds, soft, non-tender abdomen


Genitourinary: no bladder tenderness


Skin: warm, normal color


Musculoskeletal: no muscle tenderness


Neurologic: AAOx3


Psychiatric: interacting appropriately, not anxious, not encephalopathic





Lab Data & Imaging Review





 10/15/18 06:50

















WBC  6.40 10^3/uL (3.80-9.50)   10/15/18  06:50    


 


RBC  4.57 10^6/uL (4.18-5.33)   10/15/18  06:50    


 


Hgb  14.4 g/dL (12.6-16.3)   10/15/18  06:50    


 


Hct  40.9 % (38.0-47.0)   10/15/18  06:50    


 


MCV  89.5 fL (81.5-99.8)   10/15/18  06:50    


 


MCH  31.5 pg (27.9-34.1)   10/15/18  06:50    


 


MCHC  35.2 g/dL (32.4-36.7)   10/15/18  06:50    


 


RDW  12.3 % (11.5-15.2)   10/15/18  06:50    


 


Plt Count  214 10^3/uL (150-400)   10/15/18  06:50    











Assessment & Plan


Assessment: 


34 yo F with hx chronic left hip pain 2/2 left hip acetabular dysplasia now s/p 

JEFF





# left hip pain: 2/2 acetabular dysplasia and now post op from left precious 

acetabular osteotomy. Patient is doing well in the post operative period, she 

is not having any pain or nausea currently. She will work with PT in the am. 

PRN opiate medications available for pain should it recur


# dysmenorrhea: patient notes a hx of severe menstrual pain and states that she 

is due for her period shortly, reviewed with her that the pain medications 

available to treat her post op pain can also be used to treat her menstrual pain


# hypotension: patient mildly hypotensive in the post operative period but 

asymptomatic, suspect this is related to anesthesia effect, will continue to 

monitor


# RAD: without e/o acute exacerbation, continue prn levalbuterol


# Patient new to my care. Old records reviewed and summarized as above. Care 

plan reviewed with ortho PA. 


Thank you for this consultation. Medicine will continue to follow while in 

house.

## 2018-10-15 NOTE — PDANEPAE
ANE History of Present Illness





JEFF





ANE Past Medical History





- Cardiovascular History


Hx Hypertension: No


Hx Arrhythmias: No


Hx Chest Pain: No


Hx Coronary Artery / Peripheral Vascular Disease: No


Hx CHF / Valvular Disease: No


Hx Palpitations: No


Cardiovascular History Comment: POSS SL MURMUR AS TEEN





- Pulmonary History


Hx COPD: No


Hx Asthma/Reactive Airway Disease: No


Hx Recent Upper Respiratory Infection: No


Hx Oxygen in Use at Home: No


Hx Sleep Apnea: No


Sleep Apnea Screening Result - Last Documented: Negative


Pulmonary History Comment: VOCAL CORD DISORDER,SOMETIMES AFTER BIG MEAL.  

INTERMITTENT BREATHING ISSUES DURING HIGH SCHOOL NO SPECIFIC TRIGGERS 

IDENTIFIED.  INFREQUENT USE OF INHALER





- Neurologic History


Hx Cerebrovascular Accident: No


Hx Seizures: No


Hx Dementia: No





- Endocrine History


Hx Diabetes: No





- Renal History


Hx Renal Disorders: Yes


Renal History Comment: HX OF UTI'S





- Liver History


Hx Hepatic Disorders: No





- Neurological & Psychiatric Hx


Hx Neurological and Psychiatric Disorders: No





- Cancer History


Hx Cancer: No





- Congenital Disorder History


Hx Congenital Disorders: Yes


Congenital History Comment: MAGALY HIPS





- GI History


Hx Gastrointestinal Disorders: Yes


Gastrointestinal History Comment: PRONE TO NAUSEA AT THE SLIGHTEST PROVACATION 

CAN HAPPEN DURING MENSES





- Other Health History


Other Health History: INTERMITTENT RASH





- Chronic Pain History


Chronic Pain: Yes (MAGALY HIPS)





- Surgical History


Prior Surgeries: LT HIP SCOPE FEMOROPLASTY 10/8/18





ANE Review of Systems


Review of Systems: 








- Exercise capacity


METS (RN): 6 METS





ANE Patient History





- Allergies


Allergies/Adverse Reactions: 








Penicillins Allergy (Verified 09/10/18 11:41)


 RASH,HIVES HANDS & ARMS








- Home Medications


Home Medications: 








Ibuprofen [Motrin (*)] 400 mg PO Q8 PRN 09/14/18 [Last Taken Unknown]


Imiquimod 1 each TP MOWEFR@21 09/14/18 [Last Taken Unknown]


Levalbuterol Inhaler [Xopenex Hfa Inhaler (*)] 2 puffs IH DAILY PRN 09/14/18 [

Last Taken Unknown]


Ondansetron Odt [Zofran Odt 4 mg (*)] 4 mg PO Q4 PRN 09/14/18 [Last Taken 

Unknown]








- NPO status


NPO Since - Liquids (Date): 10/15/18


NPO Since - Liquids (Time): 04:00


NPO Since - Solids (Date): 10/14/18


NPO Since - Solids (Time): 20:00





- Smoking Hx


Smoking Status: Never smoked





- Family Anes Hx


Family Hx Anesthesia Complications: NEG





ANE Labs/Vital Signs





- Vital Signs


Blood Pressure: 94/74


Heart Rate: 74


Respiratory Rate: 16


O2 Sat (%): 98


Height: 177.8 cm


Weight: 63.503 kg





ANE Physical Exam





- Airway


Neck exam: FROM


Mallampati Score: Class 2


Mouth exam: normal dental/mouth exam





- Pulmonary


Pulmonary: clear to auscultation





- Cardiovascular


Cardiovascular: regular rate and rhythym





- ASA Status


ASA Status: I





ANE Anesthesia Plan


Anesthesia Plan: general endotracheal anesthesia

## 2018-10-16 LAB — PLATELET # BLD: 171 10^3/UL (ref 150–400)

## 2018-10-16 RX ADMIN — NAPROXEN SODIUM SCH MG: 220 TABLET, FILM COATED ORAL at 08:02

## 2018-10-16 RX ADMIN — DOCUSATE SODIUM AND SENNOSIDES SCH TAB: 50; 8.6 TABLET ORAL at 08:05

## 2018-10-16 RX ADMIN — ACETAMINOPHEN PRN MG: 325 TABLET ORAL at 08:04

## 2018-10-16 RX ADMIN — Medication SCH EA: at 03:47

## 2018-10-16 RX ADMIN — DIAZEPAM PRN MG: 2 TABLET ORAL at 08:07

## 2018-10-16 RX ADMIN — Medication SCH: at 13:04

## 2018-10-16 RX ADMIN — POLYETHYLENE GLYCOL 3350 PRN GM: 17 POWDER, FOR SOLUTION ORAL at 08:07

## 2018-10-16 RX ADMIN — OXYCODONE HYDROCHLORIDE PRN MG: 15 TABLET ORAL at 08:03

## 2018-10-16 RX ADMIN — OXYCODONE HYDROCHLORIDE PRN MG: 15 TABLET ORAL at 22:48

## 2018-10-16 RX ADMIN — DIAZEPAM PRN MG: 2 TABLET ORAL at 11:25

## 2018-10-16 RX ADMIN — NAPROXEN SODIUM SCH MG: 220 TABLET, FILM COATED ORAL at 14:33

## 2018-10-16 RX ADMIN — OXYCODONE HYDROCHLORIDE PRN MG: 15 TABLET ORAL at 19:51

## 2018-10-16 RX ADMIN — ACETAMINOPHEN PRN MG: 325 TABLET ORAL at 16:46

## 2018-10-16 RX ADMIN — DOCUSATE SODIUM AND SENNOSIDES SCH TAB: 50; 8.6 TABLET ORAL at 19:51

## 2018-10-16 RX ADMIN — OXYCODONE HYDROCHLORIDE PRN MG: 15 TABLET ORAL at 11:22

## 2018-10-16 RX ADMIN — DIAZEPAM PRN MG: 2 TABLET ORAL at 16:46

## 2018-10-16 RX ADMIN — ACETAMINOPHEN PRN MG: 325 TABLET ORAL at 11:21

## 2018-10-16 RX ADMIN — PANTOPRAZOLE SODIUM SCH MG: 40 TABLET, DELAYED RELEASE ORAL at 08:05

## 2018-10-16 RX ADMIN — OXYCODONE HYDROCHLORIDE PRN MG: 15 TABLET ORAL at 14:33

## 2018-10-16 RX ADMIN — OXYCODONE HYDROCHLORIDE PRN MG: 15 TABLET ORAL at 16:45

## 2018-10-16 RX ADMIN — NAPROXEN SODIUM SCH MG: 220 TABLET, FILM COATED ORAL at 20:45

## 2018-10-16 RX ADMIN — SODIUM CHLORIDE SCH MLS: 900 INJECTION, SOLUTION INTRAVENOUS at 01:07

## 2018-10-16 RX ADMIN — DIAZEPAM PRN MG: 2 TABLET ORAL at 20:45

## 2018-10-16 NOTE — ASMTCMCOM
CM Note

 

CM Note                       

Notes:

Patient is POD #1 JEFF for hip dysplasia. She is doing well post-operatively.



Patient lives with her , and her mother is coming to stay with her upon discharge. PT has 

evaluated and cleared her for home. If any discharge needs arise, CM is 

available. Otherwise, anticipate an independent discharge. 

 

Date Signed:  10/16/2018 10:10 AM

Electronically Signed By:Esha Rader RN

## 2018-10-16 NOTE — PDPAINCON
Pain Management Consultation


Patient referred by : Vi





- Subjective


Pain at rest (/10): 0


Pain is: no pain at all





- Objective


Technique: spinal opioid


Vital signs: stable





- Assessment/Plan


Additional comments: Pain well Controlled, but is just starting to notice some 

discomfort. RN staff is getting the PCA set-up.  Ortho will oversee pain 

management going forward.

## 2018-10-16 NOTE — SOAPPROG
SOAP Progress Note


Assessment/Plan: 


Assessment:





1 day post op


Left Periacetabular Osteotomy























Plan:





Oral analgesia


up with PT/OT


pelvis x-ray POD#3





10/16/18 22:44





Subjective: 





Steff was seen at 1800, at that time she was walking the halls and doing very 

well.  She has been very well pain managed with oral analgesia.  She denies any 

cp, sob or nausea.


Objective: 





 Vital Signs











Temp Pulse Resp BP Pulse Ox


 


 36.5 C   77   16   89/59 L  99 


 


 10/16/18 19:44  10/16/18 19:44  10/16/18 19:44  10/16/18 19:44  10/16/18 19:44








 Laboratory Results





 10/16/18 04:30 





 10/16/18 04:30 





 











 10/15/18 10/16/18 10/17/18





 05:59 05:59 05:59


 


Intake Total  4700 850


 


Output Total  3200 3040


 


Balance  1500 -2190








Well appearing in NAD





Left hip:





dressings clean dry inact


scattered ecchymosis and edema


no thigh numbness


NVI distally


full ROM of foot and ankle





ICD10 Worksheet


Patient Problems: 


 Problems











Problem Status Onset


 


Hip dysplasia Acute

## 2018-10-16 NOTE — HOSPPROG
Hospitalist Progress Note


Assessment/Plan: 





32 yo F with hx chronic left hip pain 2/2 left hip acetabular dysplasia now s/p 

JEFF. Today is my first encounter w the patient, chart reviewed.








*left hip pain due to acetabular dysplasia


-s/p JEFF


-prn pain medications


-pain is very well managed





*anemia


-will follow





*hypotension


-asymptomatic





*RAD


-no s/sx








*Plan: continued monitoring of labs, yun removal when ok w surgical team.











Subjective: Steff is feeling well today, eating and drinking, not having 

significant pain.


Objective: 


 Vital Signs











Temp Pulse Resp BP Pulse Ox


 


 36.3 C   70   14   88/59 L  93 


 


 10/16/18 12:00  10/16/18 12:00  10/16/18 12:00  10/16/18 12:00  10/16/18 12:00








 Laboratory Results





 10/16/18 04:30 





 10/16/18 04:30 





 











 10/15/18 10/16/18 10/17/18





 05:59 05:59 05:59


 


Intake Total  4700 500


 


Output Total  3200 1040


 


Balance  1500 -540














- Physical Exam


Constitutional: no apparent distress, appears nourished, not in pain


Eyes: PERRL


Ears, Nose, Mouth, Throat: hearing normal


Cardiovascular: regular rate and rhythym


Respiratory: no respiratory distress


Skin: warm, other (swelling at hip site, left)


Neurologic: AAOx3


Psychiatric: interacting appropriately





ICD10 Worksheet


Patient Problems: 


 Problems











Problem Status Onset


 


Hip dysplasia Acute  














- ICD10 Problem Qualifiers


(1) Hip dysplasia

## 2018-10-17 RX ADMIN — DIAZEPAM PRN MG: 2 TABLET ORAL at 02:09

## 2018-10-17 RX ADMIN — OXYCODONE HYDROCHLORIDE PRN MG: 15 TABLET ORAL at 14:14

## 2018-10-17 RX ADMIN — DIAZEPAM PRN MG: 2 TABLET ORAL at 09:53

## 2018-10-17 RX ADMIN — NAPROXEN SODIUM SCH MG: 220 TABLET, FILM COATED ORAL at 08:49

## 2018-10-17 RX ADMIN — OXYCODONE HYDROCHLORIDE PRN MG: 15 TABLET ORAL at 19:30

## 2018-10-17 RX ADMIN — ACETAMINOPHEN PRN MG: 325 TABLET ORAL at 19:31

## 2018-10-17 RX ADMIN — DOCUSATE SODIUM AND SENNOSIDES SCH: 50; 8.6 TABLET ORAL at 19:30

## 2018-10-17 RX ADMIN — ASPIRIN SCH MG: 81 TABLET, DELAYED RELEASE ORAL at 14:17

## 2018-10-17 RX ADMIN — NAPROXEN SODIUM SCH MG: 220 TABLET, FILM COATED ORAL at 21:26

## 2018-10-17 RX ADMIN — ACETAMINOPHEN PRN MG: 325 TABLET ORAL at 06:25

## 2018-10-17 RX ADMIN — OXYCODONE HYDROCHLORIDE PRN MG: 15 TABLET ORAL at 09:51

## 2018-10-17 RX ADMIN — DOCUSATE SODIUM AND SENNOSIDES SCH TAB: 50; 8.6 TABLET ORAL at 08:51

## 2018-10-17 RX ADMIN — DIAZEPAM PRN MG: 2 TABLET ORAL at 14:13

## 2018-10-17 RX ADMIN — PANTOPRAZOLE SODIUM SCH MG: 40 TABLET, DELAYED RELEASE ORAL at 08:49

## 2018-10-17 RX ADMIN — OXYCODONE HYDROCHLORIDE PRN MG: 15 TABLET ORAL at 06:25

## 2018-10-17 RX ADMIN — OXYCODONE HYDROCHLORIDE PRN MG: 15 TABLET ORAL at 02:09

## 2018-10-17 RX ADMIN — NAPROXEN SODIUM SCH MG: 220 TABLET, FILM COATED ORAL at 14:14

## 2018-10-17 RX ADMIN — ACETAMINOPHEN PRN MG: 325 TABLET ORAL at 14:13

## 2018-10-17 RX ADMIN — POLYETHYLENE GLYCOL 3350 PRN GM: 17 POWDER, FOR SOLUTION ORAL at 08:51

## 2018-10-17 NOTE — SOAPPROG
SOAP Progress Note


Assessment/Plan: 


Assessment:





32 yo F POD #2 s/p L JEFF, doing well post-operatively.




















Plan:





XR AP pelvis tonight or early tomorrow -- needs to be cleared by Dr. Bravo 

prior to d/c


Continue pain control


PT/OT -- needs to do stairs prior to d/c as has 15 steps into home


Plan for possible d/c tomorrow after stairs with PT, XR cleared





10/17/18 18:32





Subjective: 


Patient reports pain is well controlled, no complaints or issues. Voiding since 

yun out. Has been up with PT and mobilizing well.





Objective: 





 Vital Signs











Temp Pulse Resp BP Pulse Ox


 


 36.8 C   81   11 L  85/47 L  94 


 


 10/17/18 15:20  10/17/18 15:20  10/17/18 15:20  10/17/18 15:20  10/17/18 15:20








 Laboratory Results





 10/17/18 04:21 





 10/16/18 04:30 





 











 10/16/18 10/17/18 10/18/18





 05:59 05:59 05:59


 


Intake Total 4700 1850 


 


Output Total 3200 3040 1000


 


Balance 1500 -1190 -1000








PE:


R hip dressings c/d/i, mild post-op edema/ecchymosis


No numbness lateral thigh or foot


5/5 TA, GSC, EHL


Palpable DP pulse, BCR <2 sec





ICD10 Worksheet


Patient Problems: 


 Problems











Problem Status Onset


 


Hip dysplasia Acute

## 2018-10-17 NOTE — HOSPPROG
Hospitalist Progress Note


Assessment/Plan: 





34 yo F with hx chronic left hip pain 2/2 left hip acetabular dysplasia now s/p 

JEFF.





*left hip pain due to acetabular dysplasia


-s/p JEFF


-doing well 





*anemia


-will follow





*hypotension


-asymptomatic





*RAD


-no s/sx








*Plan: continued care, she is doing well today. Hopefully, home soon per 

surgical team.








Subjective: Steff has no complaints.


Objective: 


 Vital Signs











Temp Pulse Resp BP Pulse Ox


 


 36.7 C   79   15   94/59 L  98 


 


 10/17/18 11:15  10/17/18 11:15  10/17/18 11:15  10/17/18 11:15  10/17/18 11:15








 Laboratory Results





 10/17/18 04:21 





 10/16/18 04:30 





 











 10/16/18 10/17/18 10/18/18





 05:59 05:59 05:59


 


Intake Total 4700 1850 


 


Output Total 3200 3040 1000


 


Balance 1500 -1190 -1000














- Physical Exam


Constitutional: no apparent distress, appears nourished, not in pain


Eyes: PERRL


Ears, Nose, Mouth, Throat: hearing normal


Cardiovascular: regular rate and rhythym


Respiratory: no respiratory distress


Gastrointestinal: normoactive bowel sounds


Skin: warm, other (left hip w swelling)


Neurologic: AAOx3


Psychiatric: interacting appropriately





ICD10 Worksheet


Patient Problems: 


 Problems











Problem Status Onset


 


Hip dysplasia Acute  














- ICD10 Problem Qualifiers


(1) Hip dysplasia

## 2018-10-18 VITALS — DIASTOLIC BLOOD PRESSURE: 68 MMHG | SYSTOLIC BLOOD PRESSURE: 106 MMHG

## 2018-10-18 RX ADMIN — PANTOPRAZOLE SODIUM SCH MG: 40 TABLET, DELAYED RELEASE ORAL at 08:25

## 2018-10-18 RX ADMIN — OXYCODONE HYDROCHLORIDE PRN MG: 15 TABLET ORAL at 03:03

## 2018-10-18 RX ADMIN — DOCUSATE SODIUM AND SENNOSIDES SCH: 50; 8.6 TABLET ORAL at 08:26

## 2018-10-18 RX ADMIN — DIAZEPAM PRN MG: 2 TABLET ORAL at 12:19

## 2018-10-18 RX ADMIN — ASPIRIN SCH MG: 81 TABLET, DELAYED RELEASE ORAL at 08:25

## 2018-10-18 RX ADMIN — OXYCODONE HYDROCHLORIDE PRN MG: 15 TABLET ORAL at 15:36

## 2018-10-18 RX ADMIN — NAPROXEN SODIUM SCH MG: 220 TABLET, FILM COATED ORAL at 15:36

## 2018-10-18 RX ADMIN — NAPROXEN SODIUM SCH MG: 220 TABLET, FILM COATED ORAL at 08:25

## 2018-10-18 RX ADMIN — DIAZEPAM PRN MG: 2 TABLET ORAL at 03:03

## 2018-10-18 NOTE — HOSPPROG
Hospitalist Progress Note


Assessment/Plan: 





32 yo F with hx chronic left hip pain 2/2 left hip acetabular dysplasia now s/p 

JEFF.





*left hip pain due to acetabular dysplasia


-s/p JEFF


-doing well 





*anemia


-stable





*hypotension


-asymptomatic





*RAD


-no s/sx








*Plan: Steff is feeling well, dc per orthopedics team.








Subjective: Steff is up in the chair, has no complaints.


Objective: 


 Vital Signs











Temp Pulse Resp BP Pulse Ox


 


 36.7 C   82   16   106/68   94 


 


 10/18/18 08:00  10/18/18 08:00  10/18/18 08:00  10/18/18 08:00  10/18/18 08:00








 Laboratory Results





 10/18/18 04:43 





 10/16/18 04:30 





 











 10/17/18 10/18/18 10/19/18





 05:59 05:59 05:59


 


Intake Total 1850 1000 200


 


Output Total 3040 1000 


 


Balance -1190 0 200














- Physical Exam


Constitutional: no apparent distress, appears nourished, not in pain


Eyes: PERRL


Ears, Nose, Mouth, Throat: hearing normal


Respiratory: no respiratory distress


Skin: warm


Musculoskeletal: generalized weakness


Neurologic: AAOx3


Psychiatric: interacting appropriately





ICD10 Worksheet


Patient Problems: 


 Problems











Problem Status Onset


 


Hip dysplasia Acute  














- ICD10 Problem Qualifiers


(1) Hip dysplasia

## 2018-10-18 NOTE — ASMTLACE
LACE

 

Length of stay for            Answers:  4-6 days                              

current admission                                                             

Acuity / Level of             Answers:  Yes                                   

Care: Did the patient                                                         

have an inpatient                                                             

admission?                                                                    

Comorbidities - select        Answers:  Opioid dependence                     

all that apply                          / Chronic pain                        

                                        Other                         Notes:  Hx of UTI

# of Emergency department     Answers:  1-2                                   

visits in the last 6                                                          

months                                                                        

Score: 13

 

Date Signed:  10/18/2018 01:35 PM

Electronically Signed By:CHUY Barbosa

## 2018-10-18 NOTE — ASDISCHSUM
----------------------------------------------

Discharge Information

----------------------------------------------

Plan Status:                                         Medically Cleared to Leave:

Discharge Date:                                      CM D/C Disposition:

ADT D/C Disposition:                                 Projected Discharge Date:

Transportation at D/C:                               Discharge Delay Reason:

Follow-Up Date:                                      Discharge Slot:

Final Diagnosis:

----------------------------------------------

Placement Information

----------------------------------------------

----------------------------------------------

Patient Contact Information

----------------------------------------------

Contact Name:SHERINE                              Relationship:Mother

Address:[facility] [address]                                      Home [phone]

                                                     Work [phone]

City:[facility] Phone:

State/Zip Code:[place] [address]                              Email:

----------------------------------------------

Financial Information

----------------------------------------------

Financial Class:Medicaid

Primary Plan Desc[place] [place] [place]        Primary Plan Number:[id number]

Secondary Plan Desc:                                 Secondary Plan Number:

 

 

----------------------------------------------

Assessment Information

----------------------------------------------

----------------------------------------------

BC CM Progress Note

----------------------------------------------

CM Note

 

CM Note                       

Notes:

Patient is POD #1 JEFF for hip dysplasia. She is doing well post-operatively.



Patient lives with her , and her mother is coming to stay with her upon discharge. PT has 

evaluated and cleared her for home. If any discharge needs arise, CM is 

available. Otherwise, anticipate an independent discharge. 

 

Date Signed:  10/16/2018 10:10 AM

Electronically Signed By:Esha Rader RN

 

 

----------------------------------------------

Intervention Information

----------------------------------------------

## 2019-03-10 NOTE — PDGENHP
History and Physical





- Chief Complaint


LEFT HIP PAIN





- History of Present Illness


1.   Right Femoroacetabular impingement (JENNY) Cam type


2.   Right Hip Dyplasia


3.   RIght femoral/tibial malrotation


4.                     History of LEFT HIP SCOPE/LEFT JEFF





HISTORY OF PRESENT ILLNESS:


Avtaris a 33 y.o.~~~active female~who I have had the pleasure to consult on 

today.~I have enjoyed meeting her. She~lives in [place], [place]. 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 

a juggler/entertainer. ~She~is ; she~has no children. ~Avtarenjoys 

unicycling, juggling, hiking, rock climbing.





Dimass ~hip pain started several years ago, with some~recalled trauma or 

injury, and with some~previous complaints.~Back in 2010- doing some hard 

stretching and felt a sensation of it slipping out of place - but this was 

nonpainful. Then 2 years ago started having lower back pain followed by hip 

pain in the past 6 months. Hips started bothering her when she began PT for her 

lower back. Avtardoes not have~a known history of hip dysplasia (h/o 

dysplasia in sister and father). 





Presentation today is of~anterior~hip pain . ~The hip does~wake her~at night 

and does~click and catch on her. Sitting can be a real struggle for her. Concepcion Huffdoes~report suffering from lower back pain episodes. 





Avtarhas not~participated in physical therapy (for the hip specifically) and 

has not~tried other conservative measures.





Avtarhas not~utilized medication for pain management.





Avtarunderstands that she~has a hip and pelvis problem which should be 

researched and wishes to get a better understanding of her~hip status, followed 

by an establishment of a treatment strategy, hoping she~would be able to get 

back to her~well being active life.














History:








Past medical history: ~


Patient ~has a past medical history of Asthma.





Relevant familial history: hip dysplasia sister and father





Past surgical history: 





Left Hip Scope


Left JEFF





Avtarhas never received general anesthesia.





I have reviewed, verified and agree with the past medical, surgical, family and 

social history.





Current Medications:~has a current medication list which includes the following 

prescription(s): ibuprofen.





ALLERGIES:~is allergic to penicillins.














Objective:








Physical Examination:


Avtaris 5 feet 9~inches tall and weighs~140~Lbs. Avtaris AAO x3; she~is 

well-nourished, in NAD. Skin is warm and dry. ~Breathing is non-labored. ~CV 

with RRR by pulse. Abdomen is soft, NTND. 


Currently, she~walks with a normal~gait.


Trendelenburg sign is positive and proprioception~is reduced, both~sides.


She~presents~with no~signs of joint laxity.~Beightons Score: 0





She~is fit looking. ~~


Lower spine examination is negative~for sciatic or femoral nerve irritation 

with negative~SLR &~femoral stretch tests. Range of motion of the spine is 

normal~for flexion, extension, and rotations, with no~associated pain. 


Strength, Sensation and pulses are normal - bilaterally


Ankles and knees exams are normal~and no~mal-alignment is evident. 


She~has~right~0.5 cm short leg length discrepancy.


Thigh circumference is symmetric with no evidence for muscle atrophy on both~

sides.





Hip ROM (degrees):











 FL ER


 At 90~hip FL IR


 At 90~hip FL AB AD EX IR


 Neutral hip ER


 Neutral hip


 


R 110 55 20 35 10 5 45 40


 


L 120 50 35 35 10 5 45 40








Specific hip and pelvis tests:











 Impingement Test ILYA Roll Add. Longus


 


R Negative +++ Negative Negative


 


L +++ +++ Negative +














 Glut. Med ITB Posterior Imp


 


R Negative


 5/5 strength Negative


 5/5 strength Negative


 


L Negative


 5/5 strength Negative


 5/5 strength Negative








Squeeze test measured normal


Bony Symphysis pubis is painful to touch while concentric activity of the 

rectus abdominis, does not~produce pain at its insertion.


Ilio Psos specific tests are negative for pain during cycling for both hips~and 

remarkable for non painful snap~~On th eleft


HF has no pain both hips.


lateral capsule tenderness on the left


Greater trochanteric burse is painful on the left hip.


Piriformis tests: FAIR is negative, with no local signs of neuritis related to 

sciatic nerve.


SIJs examination is painful bilaterally with normal~ILYA in relation and 

local tenderness.


Hamstrings tests are negative both hips.





Tibial torsion - 55 bilaterally ER 





On a daily basis, the following percentages reflect Concepcion's overall total pain

:


Deep anterior hip: 50%


SI joint: 50%





Imaging:


Radiology studies which I~have personally reviewed, analyzed and measured are 

below:





XR: 


AP of the hip and pelvis: 


Performed in a suboptimal~technique 


Coccyx to pubic symphysis distance 2.2 cm.


0 degrees caudal





[place]~Lines are preserved.


Marked Pathological signs are seen in the Symphysis Pubis. 


No Pathological signs are seen at the Ischial~tuberosity. ~





Specific measurements show: 














 NSA~ LCE Sourcil~Angle  Sharp's angle Lat.


 Cam Lat. Pincer C.Over~sign Head~Coverage % ATDmm


 


R 128 16 9 41 N N N 65 +


 


L 125 19 11 40 N N N 73 +




















 Pos. wall sign ISS NAD ~~Dysplasia Comments


 


R + Negative 18.6 mm +++ 


 


L + Negative 14.4 mm ++ 


 


 Sclerosis Sup. Lat. OA Cysts Joint Space-WBZ Joint Space-Medial


 


R Negative Negative Negative 4.6 mm 4.5 mm


 


L Negative Negative Negative 4.5 mm 5.1 mm











X Table lateral: 


Anterior cam lesion is seen~on both hips.





Alpha Angle: ~


Right 74 dergrees


Left 76 degrees





Impression and plan:~


Avtaris a 33 y.o.~active female~suffering from symptomatic Left hip pain due 

to Left Hip Retained hardware, Left Femoroacetabular impingement (JENNY) Cam type

, and Left (clinical) femoral and tibial malrotation causing significant 

disability to her~and altering~her~sport and life activities.





Physical examination, imaging, and her~story correspond with the diagnosis 

mentioned above.





I explained that hip dysplasia is a condition wherein the hip joint has 

excessive play~and instability due to a variety of factors, including the 

depth and adequacy of the socket, the orientation of the femur bone, and 

ligament laxity around the hip joint. Dysplasia ranges in severity from 

borderline to clement, with treatment options being specific to the specific 

nature of the problem. Left untreated, the instability in the hip joint can 

cause progressive tearing of the labrum and deterioration of the surface 

cartilage, ultimately resulting in progressive osteoarthritis of the hip. 





I explained that femoroacetabular impingement (JENNY - Cam type) arises due to a 

bony or soft tissue conflict between the femur (ball) and acetabulum (socket) 

caused by an abnormality in the shape of the femoral head and neck. Over time, 

repetitive impingement can result in damage to the labrum and adjacent surface 

cartilage within the socket, ultimately giving rise to progressive 

osteoarthritis of the hip.





I explained that although a labral tear can be a source of pain, it is rarely 

the root of the problem and typically occurs secondary to an underlying 

abnormality in the shape and mechanics of the hip joint. 





I reviewed conservative treatment options for Dysplasia and JENNY including 

activity modification to avoid positions of impingement or instability, 

physical therapy, non-steroidal anti-inflammatory medications, and various 

injections (corticosteroid and PRP) aimed at reducing inflammation in the hip 

joint or/and preventing dynamic instability and impingement. PRP injections may 

promote healing and reduce symptoms in certain cases but it will not repair 

chronically damaged tissue. Although these measures may help to buy time~and 

reduce current level of symptoms, they are not a definitive solution to the 

problem given the underlying abnormality in the shape of the hip joint.





Patients who have failed conservative management and continue to experience 

symptoms are candidates for definitive surgical treatment, which may consist of 

hip arthroscopy alone or in combination with more invasive bony realignment 

procedures of the hip socket and/or femur called periacetabular osteotomy (JEFF) 

or derotational femoral osteotomy (DFO). 





Hip arthroscopy typically includes treating the labrum with either repair or 

reconstruction of the torn labrum; as well as addressing the underlying 

abnormalities by restoring the normal shape to the hip joint. If the cartilage 

is damaged a Microfracture surgical procedure may also be necessary to help 

stimulate the growth of fibrocartilage. If a patient requires a labral 

reconstruction or a Microfracture, the initial rehabilitation from the surgery 

may take longer, but the long term results are typically favorable.





Avtarwill review the info presented.





In order to obtain more detailed information regarding the alignment, 

orientation, and shape of the bony hip and pelvis I will order a CT scan to be 

performed. The results of the CT scan, including femoral torsion and acetabular 

version measured values and 3D images, will aid me in deciding on the best 

treatment strategy and surgical pre-planning. 


(Given clinical signs of excessive external tibial torsion will get CT that 

includes proximal and distal tibia).





Concepcion is going to think about her options and get back to us.








Avtaris happy with this plan.





I have also supplied her~with handouts, outlining the expected surgical 

treatment and rehab involved.





I wish~Concepcion~all the best,











~~


Mandie Mancuso MD





























History Information





- Allergies/Home Medication List


Allergies/Adverse Reactions: 








Penicillins Allergy (Verified 09/10/18 11:41)


 RASH,HIVES HANDS & ARMS





Home Medications: 








Imiquimod 1 each TP MOWEFR@21 09/14/18 [Last Taken Unknown]


Levalbuterol Inhaler [Xopenex Hfa Inhaler (*)] 2 puffs IH DAILY PRN 09/14/18 [

Last Taken Unknown]





I have personally reviewed and updated: medical history





- Past Medical History


Additional medical history: dysmenorrhea.  left hip pain





- Surgical History


Reports: no pertinent surgical hx





- Family History


Positive for: non-pertinent





- Social History


Smoking Status: Never smoked


Additional social history: juggler/entertainer





Review of Systems


Review of Systems: 








Physical Exam


Physical Exam:

## 2019-03-11 ENCOUNTER — HOSPITAL ENCOUNTER (OUTPATIENT)
Dept: HOSPITAL 80 - FSGY | Age: 34
Discharge: HOME | End: 2019-03-11
Attending: ORTHOPAEDIC SURGERY
Payer: MEDICAID

## 2019-03-11 VITALS — SYSTOLIC BLOOD PRESSURE: 95 MMHG | DIASTOLIC BLOOD PRESSURE: 66 MMHG

## 2019-03-11 DIAGNOSIS — Z87.39: ICD-10-CM

## 2019-03-11 DIAGNOSIS — J45.909: ICD-10-CM

## 2019-03-11 DIAGNOSIS — T84.84XA: Primary | ICD-10-CM

## 2019-03-11 PROCEDURE — 0QP304Z REMOVAL OF INTERNAL FIXATION DEVICE FROM LEFT PELVIC BONE, OPEN APPROACH: ICD-10-PCS | Performed by: ORTHOPAEDIC SURGERY

## 2019-03-11 NOTE — PDANEPAE
ANE Past Medical History





- Cardiovascular History


Hx Hypertension: No


Hx Arrhythmias: No


Hx Chest Pain: No


Hx Coronary Artery / Peripheral Vascular Disease: No


Hx CHF / Valvular Disease: No


Hx Palpitations: No


Cardiovascular History Comment: POSS SL MURMUR AS TEEN





- Pulmonary History


Hx COPD: No


Hx Asthma/Reactive Airway Disease: No


Hx Recent Upper Respiratory Infection: No


Hx Oxygen in Use at Home: No


Hx Sleep Apnea: No


Sleep Apnea Screening Result - Last Documented: Negative


Pulmonary History Comment: VOCAL CORD DISORDER,SOMETIMES AFTER BIG MEAL.  

INTERMITTENT BREATHING ISSUES DURING HIGH SCHOOL NO SPECIFIC TRIGGERS 

IDENTIFIED.  INFREQUENT USE OF INHALER





- Neurologic History


Hx Cerebrovascular Accident: No


Hx Seizures: No


Hx Dementia: No





- Endocrine History


Hx Diabetes: No





- Renal History


Hx Renal Disorders: Yes


Renal History Comment: HX OF UTI'S





- Liver History


Hx Hepatic Disorders: No





- Neurological & Psychiatric Hx


Hx Neurological and Psychiatric Disorders: No





- Cancer History


Hx Cancer: No





- Congenital Disorder History


Hx Congenital Disorders: Yes


Congenital History Comment: MAGALY HIPS





- GI History


Hx Gastrointestinal Disorders: Yes


Gastrointestinal History Comment: PRONE TO NAUSEA AT THE SLIGHTEST PROVACATION 

CAN HAPPEN DURING MENSES





- Other Health History


Other Health History: INTERMITTENT RASH





- Chronic Pain History


Chronic Pain: Yes (MILD L HIP)





- Surgical History


Prior Surgeries: JEFF.  LT HIP SCOPE FEMOROPLASTY 10/8/18





ANE Review of Systems


Review of Systems: 








- Exercise capacity


METS (RN): 5 METS





ANE Patient History





- Allergies


Allergies/Adverse Reactions: 








Penicillins Allergy (Verified 09/10/18 11:41)


 RASH,HIVES HANDS & ARMS








- Home Medications


Home Medications: 








Imiquimod 1 each TP MOWEFR@21 09/14/18 [Last Taken Unknown]


Levalbuterol Inhaler [Xopenex Hfa Inhaler (*)] 2 puffs IH DAILY PRN 09/14/18 [

Last Taken Unknown]








- NPO status


NPO Since - Liquids (Date): 03/11/19


NPO Since - Liquids (Time): 04:25


NPO Since - Solids (Date): 03/10/19


NPO Since - Solids (Time): 18:00





- Smoking Hx


Smoking Status: Never smoked





- Family Anes Hx


Family Hx Anesthesia Complications: NEG





ANE Labs/Vital Signs





- Vital Signs


Blood Pressure: 104/67


Heart Rate: 75


Respiratory Rate: 16


O2 Sat (%): 95


Height: 177.8 cm


Weight: 63.503 kg





ANE Physical Exam





- Airway


Mallampati Score: Class 1





- ASA Status


ASA Status: II





ANE Anesthesia Plan


Anesthesia Plan: GA w LMA